# Patient Record
Sex: MALE | Race: WHITE | Employment: OTHER | URBAN - METROPOLITAN AREA
[De-identification: names, ages, dates, MRNs, and addresses within clinical notes are randomized per-mention and may not be internally consistent; named-entity substitution may affect disease eponyms.]

---

## 2018-04-05 PROBLEM — S39.012D LUMBAR SPINE STRAIN, SUBSEQUENT ENCOUNTER: Status: ACTIVE | Noted: 2018-04-05

## 2018-06-05 PROBLEM — Z98.890 S/P LUMBAR LAMINECTOMY: Status: ACTIVE | Noted: 2018-06-05

## 2018-06-05 PROBLEM — M48.061 SPINAL STENOSIS OF LUMBAR REGION WITHOUT NEUROGENIC CLAUDICATION: Status: ACTIVE | Noted: 2018-06-05

## 2018-06-05 PROBLEM — M51.36 DDD (DEGENERATIVE DISC DISEASE), LUMBAR: Status: ACTIVE | Noted: 2018-06-05

## 2021-04-12 ENCOUNTER — TELEPHONE (OUTPATIENT)
Dept: ORTHOPEDICS CLINIC | Facility: CLINIC | Age: 76
End: 2021-04-12

## 2021-04-12 DIAGNOSIS — M25.561 RIGHT KNEE PAIN, UNSPECIFIED CHRONICITY: Primary | ICD-10-CM

## 2021-04-12 NOTE — TELEPHONE ENCOUNTER
Called and spoke with Rosalia Conti regarding his right knee pain. I explained that I would be placing an order in for him to have xray's taken prior to his appointment.  Patient stated that he wanted to be seen sooner and requested to been seen by Marion Yi but [facility]

## 2021-04-14 ENCOUNTER — OFFICE VISIT (OUTPATIENT)
Dept: ORTHOPEDICS CLINIC | Facility: CLINIC | Age: 76
End: 2021-04-14
Payer: MEDICARE

## 2021-04-14 ENCOUNTER — HOSPITAL ENCOUNTER (OUTPATIENT)
Dept: GENERAL RADIOLOGY | Age: 76
Discharge: HOME | End: 2021-04-14
Attending: ORTHOPAEDIC SURGERY
Payer: MEDICARE

## 2021-04-14 VITALS — WEIGHT: 235 LBS | HEIGHT: 72 IN | BODY MASS INDEX: 31.83 KG/M2

## 2021-04-14 DIAGNOSIS — M65.9 SYNOVITIS OF RIGHT KNEE: ICD-10-CM

## 2021-04-14 DIAGNOSIS — M94.261 CHONDROMALACIA OF RIGHT KNEE: Primary | ICD-10-CM

## 2021-04-14 DIAGNOSIS — M25.561 RIGHT KNEE PAIN, UNSPECIFIED CHRONICITY: ICD-10-CM

## 2021-04-14 PROCEDURE — 20610 DRAIN/INJ JOINT/BURSA W/O US: CPT | Performed by: PHYSICIAN ASSISTANT

## 2021-04-14 PROCEDURE — 99213 OFFICE O/P EST LOW 20 MIN: CPT | Performed by: PHYSICIAN ASSISTANT

## 2021-04-14 PROCEDURE — 73564 X-RAY EXAM KNEE 4 OR MORE: CPT | Performed by: ORTHOPAEDIC SURGERY

## 2021-04-14 RX ORDER — TRIAMCINOLONE ACETONIDE 40 MG/ML
40 INJECTION, SUSPENSION INTRA-ARTICULAR; INTRAMUSCULAR ONCE
Status: COMPLETED | OUTPATIENT
Start: 2021-04-14 | End: 2021-04-14

## 2021-04-14 RX ADMIN — TRIAMCINOLONE ACETONIDE 40 MG: 40 INJECTION, SUSPENSION INTRA-ARTICULAR; INTRAMUSCULAR at 14:26:00

## 2021-04-14 NOTE — PROGRESS NOTES
[facility] New Problem Note    CC: Right knee pain    HPI: This 78year old male presents today with complaints of right knee pain. Onset of symptoms started approximately 2 months ago with no known injury.   He states that he noticed pain with getti LUMBAR Right 6/19/2018    Performed by Sukumar Maki MD at [address] [place] [address]   • TRANSFORAMINAL EPIDURAL - LUMBAR Right 6/5/2018    Performed by Sukumar Maki MD at [address] [facility] Occupational History      Not on file    Tobacco Use      Smoking status: Former Smoker      Smokeless tobacco: Never Used    Substance and Sexual Activity      Alcohol use: Yes      Drug use: No      Sexual activity: Not on file       ROS:  Complete revie under meticulous sterile technique 4 cc of Xylocaine, 4 cc of Marcaine, and 40 mg of Kenalog were injected to the right knee via a lateral patellofemoral approach. A band aid was placed over the injection site and they tolerated the procedure well.   Juan Jose

## 2021-05-12 ENCOUNTER — OFFICE VISIT (OUTPATIENT)
Dept: ORTHOPEDICS CLINIC | Facility: CLINIC | Age: 76
End: 2021-05-12
Payer: MEDICARE

## 2021-05-12 VITALS — WEIGHT: 225 LBS | HEIGHT: 72 IN | BODY MASS INDEX: 30.48 KG/M2

## 2021-05-12 DIAGNOSIS — M23.303 MENISCUS, MEDIAL, DERANGEMENT, RIGHT: Primary | ICD-10-CM

## 2021-05-12 PROCEDURE — 99213 OFFICE O/P EST LOW 20 MIN: CPT | Performed by: ORTHOPAEDIC SURGERY

## 2021-05-12 NOTE — PROGRESS NOTES
Patient is a 24-year-old white male with complaints of right knee pain. Patient states this is been going on for couple of months. Patient denies any specific trauma but he notes abrupt and sharp pains of the knee as well as achiness.   Patient states thi

## 2021-05-28 ENCOUNTER — HOSPITAL ENCOUNTER (OUTPATIENT)
Dept: MRI IMAGING | Facility: HOSPITAL | Age: 76
Discharge: HOME | End: 2021-05-28
Attending: ORTHOPAEDIC SURGERY
Payer: MEDICARE

## 2021-05-28 DIAGNOSIS — M23.303 MENISCUS, MEDIAL, DERANGEMENT, RIGHT: ICD-10-CM

## 2021-05-28 PROCEDURE — 73721 MRI JNT OF LWR EXTRE W/O DYE: CPT | Performed by: ORTHOPAEDIC SURGERY

## 2021-06-02 ENCOUNTER — OFFICE VISIT (OUTPATIENT)
Dept: ORTHOPEDICS CLINIC | Facility: CLINIC | Age: 76
End: 2021-06-02
Payer: MEDICARE

## 2021-06-02 VITALS — BODY MASS INDEX: 30.34 KG/M2 | WEIGHT: 224 LBS | HEIGHT: 72 IN

## 2021-06-02 DIAGNOSIS — M94.261 CHONDROMALACIA OF RIGHT KNEE: ICD-10-CM

## 2021-06-02 DIAGNOSIS — M23.306 DEGENERATIVE TEAR OF MENISCUS OF RIGHT KNEE: Primary | ICD-10-CM

## 2021-06-02 PROCEDURE — 99213 OFFICE O/P EST LOW 20 MIN: CPT | Performed by: ORTHOPAEDIC SURGERY

## 2021-06-02 NOTE — PROGRESS NOTES
Patient is a 82-year-old white male here for follow-up. His wife is with him today. Patient did have an MRI scan performed of his right knee and I reviewed this with him.   This does show him to have a degenerative tear of the posterior horn of the medial

## 2021-06-08 DIAGNOSIS — M25.562 ACUTE PAIN OF LEFT KNEE: Primary | ICD-10-CM

## 2021-06-11 ENCOUNTER — HOSPITAL ENCOUNTER (OUTPATIENT)
Dept: MRI IMAGING | Facility: HOSPITAL | Age: 76
Discharge: HOME | End: 2021-06-11
Attending: ORTHOPAEDIC SURGERY
Payer: MEDICARE

## 2021-06-11 ENCOUNTER — TELEPHONE (OUTPATIENT)
Dept: ORTHOPEDICS CLINIC | Facility: CLINIC | Age: 76
End: 2021-06-11

## 2021-06-11 DIAGNOSIS — S83.232D COMPLEX TEAR OF MEDIAL MENISCUS OF LEFT KNEE, UNSPECIFIED WHETHER OLD OR CURRENT TEAR, SUBSEQUENT ENCOUNTER: Primary | ICD-10-CM

## 2021-06-11 DIAGNOSIS — M25.562 ACUTE PAIN OF LEFT KNEE: ICD-10-CM

## 2021-06-11 PROCEDURE — 73721 MRI JNT OF LWR EXTRE W/O DYE: CPT | Performed by: ORTHOPAEDIC SURGERY

## 2021-06-11 NOTE — TELEPHONE ENCOUNTER
Discussed with the patient the results of his MRI scan of his left knee. Explained to him there is a meniscus tear present. I advised him to probably go ahead with an arthroscopy of both knees. We will have our office call him to set up a date.

## 2021-06-14 ENCOUNTER — TELEPHONE (OUTPATIENT)
Dept: ORTHOPEDICS CLINIC | Facility: CLINIC | Age: 76
End: 2021-06-14

## 2021-06-16 NOTE — TELEPHONE ENCOUNTER
No prior auth or precert required for OP CPT codes 90306 x2, 62031, 01398, for tracking purposes only

## 2021-06-22 ENCOUNTER — TELEPHONE (OUTPATIENT)
Dept: ORTHOPEDICS CLINIC | Facility: CLINIC | Age: 76
End: 2021-06-22

## 2021-06-22 DIAGNOSIS — M25.551 RIGHT HIP PAIN: Primary | ICD-10-CM

## 2021-06-22 NOTE — TELEPHONE ENCOUNTER
Called and spoke with Arden Sbaillon about his upcoming appointment with Clarisa Rosales for his right hip, I explained that I would go ahead and place the order for xray's of his right hip.  I also asked if her could come atleast 20/30 mins prior to his appoin

## 2021-06-22 NOTE — TELEPHONE ENCOUNTER
Future Appointments   Date Time Provider Loraine Nichole   6/30/2021  2:00 PM Cheryl MORALEZ PA-C [facility]     · Pt scheduled through mychart  · RIGHT hip pain  · No imaging in Epic  · Please advise if imaging is needed

## 2021-06-30 ENCOUNTER — OFFICE VISIT (OUTPATIENT)
Dept: ORTHOPEDICS CLINIC | Facility: CLINIC | Age: 76
End: 2021-06-30
Payer: MEDICARE

## 2021-06-30 ENCOUNTER — HOSPITAL ENCOUNTER (OUTPATIENT)
Dept: GENERAL RADIOLOGY | Age: 76
Discharge: HOME | End: 2021-06-30
Attending: PHYSICIAN ASSISTANT
Payer: MEDICARE

## 2021-06-30 VITALS — WEIGHT: 225 LBS | HEIGHT: 72 IN | BODY MASS INDEX: 30.48 KG/M2

## 2021-06-30 DIAGNOSIS — M16.11 PRIMARY OSTEOARTHRITIS OF RIGHT HIP: Primary | ICD-10-CM

## 2021-06-30 DIAGNOSIS — M25.551 RIGHT HIP PAIN: ICD-10-CM

## 2021-06-30 PROCEDURE — 99213 OFFICE O/P EST LOW 20 MIN: CPT | Performed by: PHYSICIAN ASSISTANT

## 2021-06-30 PROCEDURE — 73502 X-RAY EXAM HIP UNI 2-3 VIEWS: CPT | Performed by: PHYSICIAN ASSISTANT

## 2021-06-30 NOTE — PROGRESS NOTES
[facility] New Problem Note    CC: Right hip pain    HPI: This 78year old male presents today with complaints of right hip pain. Onset of symptoms started in the last few weeks.   He states that he has been diagnosed with tight hip flexors in the pa every morning and evening. 28 g 5   • Naproxen Sodium (ALEVE) 220 MG Oral Tab Take by mouth. • atorvastatin 40 MG Oral Tab      • JANUVIA 100 MG Oral Tab      • Calcium Carbonate Antacid (ANTACID CALCIUM OR) Take  by mouth as needed.      • Omeprazole 4 mentioned above. Physical Exam: He is a pleasant 79-year-old male in no acute distress. Ambulates with a nonantalgic gait. Exam of the right hip and lower extremity reveals that he is nontender to palpation over the greater trochanteric bursa.   He h

## 2021-07-01 ENCOUNTER — MED REC SCAN ONLY (OUTPATIENT)
Dept: ORTHOPEDICS CLINIC | Facility: CLINIC | Age: 76
End: 2021-07-01

## 2021-07-01 ENCOUNTER — TELEPHONE (OUTPATIENT)
Dept: ORTHOPEDICS CLINIC | Facility: CLINIC | Age: 76
End: 2021-07-01

## 2021-07-01 DIAGNOSIS — M79.89 LEG SWELLING: Primary | ICD-10-CM

## 2021-07-01 NOTE — TELEPHONE ENCOUNTER
Patient called to request a RX for compression socks. Please place Rx if acceptable. Please see Previous message between Patient and LYNETTE Brown. Thank you.

## 2021-07-01 NOTE — TELEPHONE ENCOUNTER
Patient will be picking up rx at [facility] to front office staff (Dme External) and asked to print off order for patient.

## 2021-07-09 ENCOUNTER — OFFICE VISIT (OUTPATIENT)
Dept: ORTHOPEDICS CLINIC | Facility: CLINIC | Age: 76
End: 2021-07-09
Payer: MEDICARE

## 2021-07-09 VITALS — WEIGHT: 225 LBS | BODY MASS INDEX: 30.48 KG/M2 | HEIGHT: 72 IN

## 2021-07-09 DIAGNOSIS — Z48.89 AFTERCARE FOLLOWING SURGERY: Primary | ICD-10-CM

## 2021-07-09 PROCEDURE — 99024 POSTOP FOLLOW-UP VISIT: CPT | Performed by: ORTHOPAEDIC SURGERY

## 2021-07-09 RX ORDER — HYDROCODONE BITARTRATE AND ACETAMINOPHEN 5; 325 MG/1; MG/1
1 TABLET ORAL EVERY 6 HOURS PRN
COMMUNITY
End: 2021-08-18

## 2021-07-12 NOTE — PROGRESS NOTES
Patient is a 79-year-old white male here for follow-up. Patient had the bilateral knee arthroscopies yesterday. Patient states that he is doing fairly well with minimal complaints. Patient's wife is with him today.     Patient's exam shows no have nonant

## 2021-07-16 ENCOUNTER — OFFICE VISIT (OUTPATIENT)
Dept: ORTHOPEDICS CLINIC | Facility: CLINIC | Age: 76
End: 2021-07-16
Payer: MEDICARE

## 2021-07-16 VITALS — HEIGHT: 72 IN | BODY MASS INDEX: 30.48 KG/M2 | WEIGHT: 225 LBS

## 2021-07-16 DIAGNOSIS — Z98.890 STATUS POST ARTHROSCOPY OF LEFT KNEE: ICD-10-CM

## 2021-07-16 DIAGNOSIS — Z98.890 STATUS POST ARTHROSCOPY OF RIGHT KNEE: ICD-10-CM

## 2021-07-16 DIAGNOSIS — Z48.89 AFTERCARE FOLLOWING SURGERY: Primary | ICD-10-CM

## 2021-07-16 PROCEDURE — 99024 POSTOP FOLLOW-UP VISIT: CPT | Performed by: ORTHOPAEDIC SURGERY

## 2021-07-16 NOTE — PROGRESS NOTES
Patient is a 22-year-old white male here for follow-up. Patient had the bilateral knee arthroscopies. He did have the meniscal tears as well as some chondromalacia. Patient is doing fairly well with minimal complaints.     Patient exam shows mild swellin

## 2021-07-30 ENCOUNTER — OFFICE VISIT (OUTPATIENT)
Dept: ORTHOPEDICS CLINIC | Facility: CLINIC | Age: 76
End: 2021-07-30
Payer: MEDICARE

## 2021-07-30 VITALS — HEIGHT: 72 IN | BODY MASS INDEX: 29.8 KG/M2 | WEIGHT: 220 LBS

## 2021-07-30 DIAGNOSIS — Z48.89 AFTERCARE FOLLOWING SURGERY: Primary | ICD-10-CM

## 2021-07-30 PROCEDURE — 99024 POSTOP FOLLOW-UP VISIT: CPT | Performed by: ORTHOPAEDIC SURGERY

## 2021-08-11 ENCOUNTER — OFFICE VISIT (OUTPATIENT)
Dept: ORTHOPEDICS CLINIC | Facility: CLINIC | Age: 76
End: 2021-08-11
Payer: MEDICARE

## 2021-08-11 VITALS — WEIGHT: 220 LBS | BODY MASS INDEX: 29.8 KG/M2 | HEIGHT: 72 IN

## 2021-08-11 DIAGNOSIS — M65.9 SYNOVITIS OF LEFT KNEE: ICD-10-CM

## 2021-08-11 DIAGNOSIS — Z98.890 STATUS POST ARTHROSCOPY OF RIGHT KNEE: Primary | ICD-10-CM

## 2021-08-11 DIAGNOSIS — Z98.890 STATUS POST ARTHROSCOPY OF LEFT KNEE: ICD-10-CM

## 2021-08-11 DIAGNOSIS — Z48.89 AFTERCARE FOLLOWING SURGERY: ICD-10-CM

## 2021-08-11 PROCEDURE — 20610 DRAIN/INJ JOINT/BURSA W/O US: CPT | Performed by: PHYSICIAN ASSISTANT

## 2021-08-11 PROCEDURE — 99024 POSTOP FOLLOW-UP VISIT: CPT | Performed by: PHYSICIAN ASSISTANT

## 2021-08-11 RX ORDER — TRIAMCINOLONE ACETONIDE 40 MG/ML
40 INJECTION, SUSPENSION INTRA-ARTICULAR; INTRAMUSCULAR ONCE
Status: COMPLETED | OUTPATIENT
Start: 2021-08-11 | End: 2021-08-11

## 2021-08-11 RX ADMIN — TRIAMCINOLONE ACETONIDE 40 MG: 40 INJECTION, SUSPENSION INTRA-ARTICULAR; INTRAMUSCULAR at 14:04:00

## 2021-08-11 NOTE — PROGRESS NOTES
[facility] Progress Note    Date of Surgery: 07/08/2021      Subjective: Lala Reedde is a 78year old male who is here approximately 1 month status post bilateral knee arthroscopies for partial meniscectomy and chondromalacia.  He states that over with questions, concerns, or worsening symptoms. Procedure: Risks, benefits, and alternatives to the cortisone injection including but not limited to risk of needle infection, steroid flareup, failure to improve, and hyperglycemia were discussed.  He wou

## 2021-08-16 ENCOUNTER — PATIENT MESSAGE (OUTPATIENT)
Dept: ORTHOPEDICS CLINIC | Facility: CLINIC | Age: 76
End: 2021-08-16

## 2021-08-16 DIAGNOSIS — M17.12 PRIMARY OSTEOARTHRITIS OF LEFT KNEE: Primary | ICD-10-CM

## 2021-08-17 ENCOUNTER — PATIENT MESSAGE (OUTPATIENT)
Dept: ORTHOPEDICS CLINIC | Facility: CLINIC | Age: 76
End: 2021-08-17

## 2021-08-17 DIAGNOSIS — Z98.890 STATUS POST ARTHROSCOPY OF LEFT KNEE: ICD-10-CM

## 2021-08-17 DIAGNOSIS — M17.12 PRIMARY OSTEOARTHRITIS OF LEFT KNEE: Primary | ICD-10-CM

## 2021-08-17 NOTE — TELEPHONE ENCOUNTER
From: Taras Kat  Sent: 8/16/2021 5:23 PM CDT  To: [facility] Clinical Pool  Subject: RE:Gel Injection    Thanks for getting back so quickly. Typically how long does this process take?  I am trying to decide if I should cancel my vacation that begi

## 2021-08-17 NOTE — TELEPHONE ENCOUNTER
From: Faye Berrios  Sent: 8/17/2021 12:23 PM CDT  To: [facility] Clinical Pool  Subject: RE: Visit Follow-up Question    Please send the script to [facility] at;   Bettie Conklin  [facility], [place]  Fax [phone]    Then will they

## 2021-08-18 ENCOUNTER — PATIENT MESSAGE (OUTPATIENT)
Dept: ORTHOPEDICS CLINIC | Facility: CLINIC | Age: 76
End: 2021-08-18

## 2021-08-18 RX ORDER — HYDROCODONE BITARTRATE AND ACETAMINOPHEN 5; 325 MG/1; MG/1
1 TABLET ORAL
Qty: 30 TABLET | Refills: 0 | Status: SHIPPED | OUTPATIENT
Start: 2021-08-18

## 2021-08-18 NOTE — TELEPHONE ENCOUNTER
LOV: 8/11/21 Jaqui Arenas  Filled:   Dispensed Written Strength Quantity Refills Days Supply Provider Pharmacy    HYDROCODON-ACETAMINOPHEN 07/08/2021 07/08/2021 5-325 mg 30 tablet  [id number]         Future Appointments   Date Time Provider

## 2021-08-18 NOTE — TELEPHONE ENCOUNTER
From: Aleksandra Stevenson  To: Uriel Moura MD  Sent: 8/18/2021 11:43 AM CDT  Subject: Prescription Question    Dr Karol Burt,    I got very little sleep last night due to pain in the right side of my left knee. 4-200mg ibuprofen did not help.  This AM I too

## 2021-08-23 ENCOUNTER — TELEPHONE (OUTPATIENT)
Dept: ORTHOPEDICS CLINIC | Facility: CLINIC | Age: 76
End: 2021-08-23

## 2021-08-23 NOTE — TELEPHONE ENCOUNTER
Called and spoke with Mindi Abo him that we were able to get him approved for the synvisc one. I did inform him that there was nothing that he had to do and that he already has an appointment set up with Dr Estella Guerrero on 8/25/21.  Synvisc one has bee

## 2021-08-25 ENCOUNTER — OFFICE VISIT (OUTPATIENT)
Dept: ORTHOPEDICS CLINIC | Facility: CLINIC | Age: 76
End: 2021-08-25
Payer: MEDICARE

## 2021-08-25 DIAGNOSIS — Z98.890 STATUS POST ARTHROSCOPY OF LEFT KNEE: ICD-10-CM

## 2021-08-25 DIAGNOSIS — Z48.89 AFTERCARE FOLLOWING SURGERY: Primary | ICD-10-CM

## 2021-08-25 DIAGNOSIS — Z98.890 STATUS POST ARTHROSCOPY OF RIGHT KNEE: ICD-10-CM

## 2021-08-25 DIAGNOSIS — M17.12 PRIMARY OSTEOARTHRITIS OF LEFT KNEE: ICD-10-CM

## 2021-08-25 DIAGNOSIS — M17.9 OSTEOARTHRITIS OF KNEE, UNSPECIFIED: ICD-10-CM

## 2021-08-25 PROCEDURE — 20610 DRAIN/INJ JOINT/BURSA W/O US: CPT | Performed by: ORTHOPAEDIC SURGERY

## 2021-08-25 PROCEDURE — 99024 POSTOP FOLLOW-UP VISIT: CPT | Performed by: ORTHOPAEDIC SURGERY

## 2021-08-25 RX ORDER — AMLODIPINE BESYLATE 10 MG/1
10 TABLET ORAL DAILY
COMMUNITY
Start: 2020-01-01

## 2021-08-25 NOTE — PROGRESS NOTES
Patient is now 3 weeks status post bilateral knee arthroscopies. Patient is doing better. Still having some stiffness of the knees. Patient is definitely better after the cortisone injections. Exam shows him to have mild swelling of the knees.   Flexi

## 2021-08-27 NOTE — PROGRESS NOTES
Patient is a 61-year-old white male here for follow-up. Patient had the arthroscopy of both knees about 6 to 7 weeks ago. Patient was doing better at his last visit but more recently is complaining of pain of his left knee.   Patient states that the right

## 2021-09-01 ENCOUNTER — MED REC SCAN ONLY (OUTPATIENT)
Dept: ORTHOPEDICS CLINIC | Facility: CLINIC | Age: 76
End: 2021-09-01

## 2021-09-08 ENCOUNTER — PATIENT MESSAGE (OUTPATIENT)
Dept: ORTHOPEDICS CLINIC | Facility: CLINIC | Age: 76
End: 2021-09-08

## 2021-09-08 NOTE — TELEPHONE ENCOUNTER
From: Heri Diaz  To: Octavia Kelly PA-C  Sent: 9/8/2021 12:05 PM CDT  Subject: Other    How do I get a copy of the X ray of my hip from 6/30/21?

## 2021-10-01 ENCOUNTER — MED REC SCAN ONLY (OUTPATIENT)
Dept: ORTHOPEDICS CLINIC | Facility: CLINIC | Age: 76
End: 2021-10-01
